# Patient Record
Sex: FEMALE | Race: WHITE | NOT HISPANIC OR LATINO | Employment: FULL TIME | URBAN - METROPOLITAN AREA
[De-identification: names, ages, dates, MRNs, and addresses within clinical notes are randomized per-mention and may not be internally consistent; named-entity substitution may affect disease eponyms.]

---

## 2017-09-04 ENCOUNTER — OFFICE VISIT (OUTPATIENT)
Dept: RETAIL CLINIC | Facility: CLINIC | Age: 37
End: 2017-09-04

## 2017-09-04 VITALS
DIASTOLIC BLOOD PRESSURE: 68 MMHG | SYSTOLIC BLOOD PRESSURE: 128 MMHG | HEIGHT: 68 IN | TEMPERATURE: 98.5 F | RESPIRATION RATE: 18 BRPM | BODY MASS INDEX: 33.56 KG/M2 | OXYGEN SATURATION: 98 % | WEIGHT: 221.4 LBS | HEART RATE: 68 BPM

## 2017-09-04 DIAGNOSIS — R05.9 COUGH: ICD-10-CM

## 2017-09-04 DIAGNOSIS — J02.9 SORE THROAT: Primary | ICD-10-CM

## 2017-09-04 LAB
EXPIRATION DATE: NORMAL
INTERNAL CONTROL: NORMAL
Lab: NORMAL
S PYO AG THROAT QL: NEGATIVE

## 2017-09-04 PROCEDURE — 87880 STREP A ASSAY W/OPTIC: CPT | Performed by: NURSE PRACTITIONER

## 2017-09-04 PROCEDURE — 99203 OFFICE O/P NEW LOW 30 MIN: CPT | Performed by: NURSE PRACTITIONER

## 2017-09-04 RX ORDER — METOPROLOL TARTRATE 50 MG/1
50 TABLET, FILM COATED ORAL 2 TIMES DAILY
COMMUNITY
End: 2018-04-30

## 2017-09-04 RX ORDER — DEXTROMETHORPHAN HYDROBROMIDE AND PROMETHAZINE HYDROCHLORIDE 15; 6.25 MG/5ML; MG/5ML
5 SYRUP ORAL 4 TIMES DAILY PRN
Qty: 180 ML | Refills: 0 | Status: SHIPPED | OUTPATIENT
Start: 2017-09-04 | End: 2018-04-30

## 2017-09-04 RX ORDER — GUAIFENESIN AND DEXTROMETHORPHAN HYDROBROMIDE 600; 30 MG/1; MG/1
1 TABLET, EXTENDED RELEASE ORAL 2 TIMES DAILY
Qty: 20 EACH | Refills: 0 | Status: SHIPPED | OUTPATIENT
Start: 2017-09-04 | End: 2018-04-30

## 2017-09-04 NOTE — PROGRESS NOTES
Subjective   Sunshine Joe is a 37 y.o. female.     History of Present Illness   Pt. Presents for cough and sore throat present for 2 days. She is not using any medications and has no fever or chills. Her son is sick at home with a viral sore throat.  The following portions of the patient's history were reviewed and updated as appropriate: allergies, current medications, past family history, past social history, past surgical history and problem list.    Review of Systems   Constitutional: Negative for activity change, appetite change, chills, fatigue and fever.   HENT: Positive for congestion and ear pain (right). Negative for ear discharge, sinus pressure, trouble swallowing and voice change.    Eyes: Negative.    Respiratory: Positive for cough. Negative for shortness of breath and wheezing.    Cardiovascular: Negative.    Musculoskeletal: Negative.    Skin: Negative.    Allergic/Immunologic: Negative.    Neurological: Negative.        Objective   Physical Exam   Constitutional: She is oriented to person, place, and time. She appears well-developed and well-nourished.   HENT:   Head: Normocephalic and atraumatic.   Right Ear: Tympanic membrane and external ear normal.   Left Ear: Tympanic membrane and external ear normal.   Nose: Mucosal edema and rhinorrhea present. Right sinus exhibits no maxillary sinus tenderness and no frontal sinus tenderness. Left sinus exhibits no maxillary sinus tenderness and no frontal sinus tenderness.   Mouth/Throat: Mucous membranes are normal. Posterior oropharyngeal erythema present. No oropharyngeal exudate, posterior oropharyngeal edema or tonsillar abscesses. Tonsils are 0 on the right. Tonsils are 0 on the left. No tonsillar exudate.   Eyes: Conjunctivae and EOM are normal. Pupils are equal, round, and reactive to light.   Neck: Normal range of motion. Neck supple.   Cardiovascular: Normal rate, regular rhythm and normal heart sounds.    Pulmonary/Chest: Effort normal  and breath sounds normal. No respiratory distress. She has no wheezes. She has no rales.   Lymphadenopathy:     She has no cervical adenopathy.   Neurological: She is alert and oriented to person, place, and time.   Skin: Skin is warm and dry.   Psychiatric: She has a normal mood and affect. Her behavior is normal. Judgment and thought content normal.   Nursing note and vitals reviewed.      Assessment/Plan   Sunshine was seen today for sore throat, cough and earache.    Diagnoses and all orders for this visit:    Sore throat  -     POC Rapid Strep A    Cough  -     guaifenesin-dextromethorphan (MUCINEX DM)  MG tablet sustained-release 12 hour tablet; Take 1 tablet by mouth 2 (Two) Times a Day.    Other orders  -     promethazine-dextromethorphan (PROMETHAZINE-DM) 6.25-15 MG/5ML syrup; Take 5 mL by mouth 4 (Four) Times a Day As Needed for Cough.      GINI Velázquez

## 2018-04-30 ENCOUNTER — OFFICE VISIT (OUTPATIENT)
Dept: OBSTETRICS AND GYNECOLOGY | Facility: CLINIC | Age: 38
End: 2018-04-30

## 2018-04-30 VITALS
BODY MASS INDEX: 35.82 KG/M2 | SYSTOLIC BLOOD PRESSURE: 120 MMHG | DIASTOLIC BLOOD PRESSURE: 80 MMHG | HEIGHT: 65 IN | WEIGHT: 215 LBS

## 2018-04-30 DIAGNOSIS — N94.6 DYSMENORRHEA: ICD-10-CM

## 2018-04-30 DIAGNOSIS — N92.6 IRREGULAR MENSES: Primary | ICD-10-CM

## 2018-04-30 DIAGNOSIS — N92.1 MENORRHAGIA WITH IRREGULAR CYCLE: ICD-10-CM

## 2018-04-30 DIAGNOSIS — F17.200 SMOKER: ICD-10-CM

## 2018-04-30 PROBLEM — Z90.49 S/P CHOLECYSTECTOMY: Status: ACTIVE | Noted: 2018-04-30

## 2018-04-30 PROBLEM — Z98.891 H/O CESAREAN SECTION: Status: ACTIVE | Noted: 2018-04-30

## 2018-04-30 PROCEDURE — 99406 BEHAV CHNG SMOKING 3-10 MIN: CPT | Performed by: OBSTETRICS & GYNECOLOGY

## 2018-04-30 PROCEDURE — 99395 PREV VISIT EST AGE 18-39: CPT | Performed by: OBSTETRICS & GYNECOLOGY

## 2018-04-30 RX ORDER — VARENICLINE TARTRATE 0.5 MG/1
0.5 TABLET, FILM COATED ORAL 2 TIMES DAILY
Qty: 60 TABLET | Refills: 3 | Status: SHIPPED | OUTPATIENT
Start: 2018-04-30 | End: 2018-07-30

## 2018-04-30 RX ORDER — CITALOPRAM 40 MG/1
TABLET ORAL
COMMUNITY
Start: 2018-03-20

## 2018-04-30 RX ORDER — METOPROLOL SUCCINATE 50 MG/1
TABLET, EXTENDED RELEASE ORAL
COMMUNITY
Start: 2018-03-20

## 2018-04-30 RX ORDER — IBUPROFEN 600 MG/1
TABLET ORAL
Refills: 0 | COMMUNITY
Start: 2018-02-18 | End: 2018-09-26

## 2018-04-30 NOTE — PROGRESS NOTES
"Subjective   Chief Complaint   Patient presents with   • Menstrual Problem     constant pain and bleeding almost everyday.      Sunshine Joe is a 38 y.o. year old No obstetric history on file. presenting to be seen for her annual exam.   She's been miserable with heavy periodsFor 5-6 days wearing 3 pads at a time.  She had 3 periods this past month.  She had to leave work because of some heavy is been going on since February.  She has \"ovary pain \"no further children desired.  She also has one stepson.  She is cramping as well.  She has 1 son change 2 years old and a stepson.  Current birth control method: none.    She is a smoker for 25 years.  Discussed health risks increased cancer risk bladder long effect on children in the home.  Increased risk of heart attack and stroke along with lung cancer.  Wrinkling of facial skin  Also cost a $5 per PACU about $2000 a year literally up and smoke.  Discussed various options she would like to go Chantix.  Discussed Nicorette gum and lozenges and patch.  Her mother was present.  Oral discussion relating to smoking cessation counseling lasted approximately 5 minutes.    Patient's last menstrual period was 04/21/2018.    She is not sexually active.   Condoms are not typically used.      Past 6 month menstrual history:    Cycle Frequency: irregular   Menstrual cycle character: flow is typically moderately heavy   Cycle Duration: 5 - 8   Number of heavy days of flows: 6   Intermenstrual bleeding present: {yes   Post-coital bleeding present: not asked     She exercises regularly: no.  Calcium intake: no.  She performs self breast exam:yes.  She has concerns about domestic violence: no.    The following portions of the patient's history were reviewed and updated as appropriate:problem list, current medications, allergies, past family history, past medical history, past social history and past surgical history.    Review of Systems    normal bladder and bowels  Objective " "  /80   Ht 164.5 cm (64.75\")   Wt 97.5 kg (215 lb)   LMP 04/21/2018 Comment: irregular periods; unknown cause  BMI 36.05 kg/m²      General:  well developed; well nourished  no acute distress  appears stated age   Skin:  No suspicious lesions seen   Thyroid:    Breasts:  Examined in supine position  Symmetric without masses or skin dimpling  Nipples normal without inversion, lesions or discharge  There are no palpable axillary nodes   Abdomen: no umbilical or inginual hernias are present  no hepato-splenomegaly  Slightly tender left lower quadrant greater than right   Pelvis: Clinical staff was present for exam  External genitalia:  normal appearance of the external genitalia including Bartholin's and [place]'s glands.  :  urethral meatus normal;  Vaginal:  normal pink mucosa without prolapse or lesions.  Cervix:  normal appearance. Pap smear obtained  Uterus:  normal size, shape and consistency. anteverted; Difficult to determine due to guarding  Adnexa:  Guarding bilaterally  Rectal:  digital rectal exam not performed; anus visually normal appearing.     Lab Review   No data reviewed    Imaging  No data reviewed       Assessment   1. Pelvic pain with dysmenorrhea and menorrhagia may be dyspareunia she can't remember irregular menses.  No children desired.  Discussed option such as IUD.  I think first will need to get an ultrasound.  2. One pack per day smoker 25-pack-year history.  She was able to quit while she was pregnant.  Would suggest Chantix as an option she would prefer this or gum or patches  3. She requested pain medications and I declined until after the ultrasound and no what's going on told her that I would rather not get her on narcotics if we can avoid   potential narcotic dependence.    Plan   1. 1000 mg calcium in divided doses ideally in diet; regular exercise  2. Ultrasound as discussed  3. Chantix with refills she just support meeting such as [facility].  4. Discussed " options possible IUD versus laparoscopy and ablation versus hysterectomy which is what she prefers.  A lot of this may be determined whether or not her insurance will cover hysterectomy.      Medications Rx this encounter:  New Medications Ordered This Visit   Medications   • citalopram (CeleXA) 40 MG tablet   • ibuprofen (ADVIL,MOTRIN) 600 MG tablet     Sig: TAKE 1 TABLET BY MOUTH 4 TIMES A DAY AS NEEDED FOR PAIN FOR 10 DAYS     Refill:  0   • metoprolol succinate XL (TOPROL-XL) 50 MG 24 hr tablet   • varenicline (CHANTIX) 0.5 MG tablet     Sig: Take 1 tablet by mouth 2 (Two) Times a Day.     Dispense:  60 tablet     Refill:  3          This note was electronically signed.    Jamari Cowart MD  4/30/2018

## 2018-05-01 ENCOUNTER — DOCUMENTATION (OUTPATIENT)
Dept: OBSTETRICS AND GYNECOLOGY | Facility: CLINIC | Age: 38
End: 2018-05-01

## 2018-05-01 ENCOUNTER — PREP FOR SURGERY (OUTPATIENT)
Dept: OTHER | Facility: HOSPITAL | Age: 38
End: 2018-05-01

## 2018-05-01 ENCOUNTER — TELEPHONE (OUTPATIENT)
Dept: OBSTETRICS AND GYNECOLOGY | Facility: CLINIC | Age: 38
End: 2018-05-01

## 2018-05-01 DIAGNOSIS — N92.1 MENORRHAGIA WITH IRREGULAR CYCLE: Primary | ICD-10-CM

## 2018-05-01 RX ORDER — SODIUM CHLORIDE 0.9 % (FLUSH) 0.9 %
1-10 SYRINGE (ML) INJECTION AS NEEDED
Status: CANCELLED | OUTPATIENT
Start: 2018-05-01

## 2018-05-01 NOTE — TELEPHONE ENCOUNTER
Provider Name  Dr Cowart    Reason for Call  Wants results from ultra sound done on 4/30/18    Pharmacy Name  [facility]  In [place], [place]    Call Back Number  [phone]

## 2018-05-11 ENCOUNTER — OUTSIDE FACILITY SERVICE (OUTPATIENT)
Dept: OBSTETRICS AND GYNECOLOGY | Facility: CLINIC | Age: 38
End: 2018-05-11

## 2018-05-11 PROCEDURE — 58563 HYSTEROSCOPY ABLATION: CPT | Performed by: OBSTETRICS & GYNECOLOGY

## 2018-05-11 RX ORDER — NYSTATIN 100000 [USP'U]/G
POWDER TOPICAL 3 TIMES DAILY
Qty: 45 G | Refills: 2 | Status: SHIPPED | OUTPATIENT
Start: 2018-05-11 | End: 2018-09-26

## 2018-07-30 ENCOUNTER — OFFICE VISIT (OUTPATIENT)
Dept: RETAIL CLINIC | Facility: CLINIC | Age: 38
End: 2018-07-30

## 2018-07-30 VITALS
HEIGHT: 64 IN | WEIGHT: 209 LBS | DIASTOLIC BLOOD PRESSURE: 78 MMHG | HEART RATE: 86 BPM | OXYGEN SATURATION: 99 % | SYSTOLIC BLOOD PRESSURE: 136 MMHG | RESPIRATION RATE: 18 BRPM | TEMPERATURE: 100 F | BODY MASS INDEX: 35.68 KG/M2

## 2018-07-30 DIAGNOSIS — R50.9 FEVER AND CHILLS: ICD-10-CM

## 2018-07-30 DIAGNOSIS — H66.004 RECURRENT ACUTE SUPPURATIVE OTITIS MEDIA OF RIGHT EAR WITHOUT SPONTANEOUS RUPTURE OF TYMPANIC MEMBRANE: Primary | ICD-10-CM

## 2018-07-30 PROCEDURE — 99213 OFFICE O/P EST LOW 20 MIN: CPT | Performed by: NURSE PRACTITIONER

## 2018-07-30 RX ORDER — PREDNISONE 5 MG/1
1 TABLET ORAL DAILY
Qty: 21 EACH | Refills: 0 | Status: SHIPPED | OUTPATIENT
Start: 2018-07-30 | End: 2018-09-26

## 2018-07-30 RX ORDER — AMOXICILLIN 500 MG/1
500 CAPSULE ORAL 2 TIMES DAILY
Qty: 20 CAPSULE | Refills: 0 | Status: SHIPPED | OUTPATIENT
Start: 2018-07-30 | End: 2018-08-09

## 2018-07-30 RX ORDER — ACETAMINOPHEN 500 MG
1000 TABLET ORAL ONCE
Status: COMPLETED | OUTPATIENT
Start: 2018-07-30 | End: 2018-07-30

## 2018-07-30 RX ADMIN — Medication 1000 MG: at 11:10

## 2018-07-30 NOTE — PROGRESS NOTES
"MAX@  Sunshine Joe is a 38 y.o. female.   Chief Complaint   Patient presents with   • Dizziness      Seen in ED \"about a week ago\" for earache.  No evidence of ear infection, told she had an air pocket around right eardrum, no treatment advised.      Dizziness   This is a new problem. The current episode started 1 to 4 weeks ago. The problem occurs constantly. The problem has been gradually worsening. Associated symptoms include chills, headaches and vertigo. Pertinent negatives include no abdominal pain, anorexia, arthralgias, change in bowel habit, chest pain, congestion, coughing, diaphoresis, fatigue, fever, joint swelling, myalgias, nausea, neck pain, numbness, rash, sore throat, swollen glands, urinary symptoms, visual change, vomiting or weakness. The symptoms are aggravated by coughing (standing up/sitting down quickly). She has tried acetaminophen and NSAIDs (cotton sock filled with cornmeal, heated in microwave used as compress) for the symptoms. The treatment provided no relief.        The following portions of the patient's history were reviewed and updated as appropriate: allergies, current medications, past family history, past medical history, past social history, past surgical history and problem list.    Current Outpatient Prescriptions:   •  amoxicillin (AMOXIL) 500 MG capsule, Take 1 capsule by mouth 2 (Two) Times a Day for 10 days., Disp: 20 capsule, Rfl: 0  •  ciprofloxacin-hydrocortisone (CIPRO HC) 0.2-1 % otic suspension, Administer 3 drops to the right ear 2 (Two) Times a Day for 5 days., Disp: 10 mL, Rfl: 0  •  citalopram (CeleXA) 40 MG tablet, , Disp: , Rfl:   •  ibuprofen (ADVIL,MOTRIN) 600 MG tablet, TAKE 1 TABLET BY MOUTH 4 TIMES A DAY AS NEEDED FOR PAIN FOR 10 DAYS, Disp: , Rfl: 0  •  metoprolol succinate XL (TOPROL-XL) 50 MG 24 hr tablet, , Disp: , Rfl:   •  nystatin (MYCOSTATIN) 165896 UNIT/GM powder, Apply  topically 3 (Three) Times a Day., Disp: 45 g, Rfl: 2  •  " "PredniSONE 5 MG (21) tablet therapy pack dosepak, Take 1 tablet by mouth Daily. Take as directed on package instructions., Disp: 21 each, Rfl: 0  No current facility-administered medications for this visit.     Allergies   Allergen Reactions   • Morphine Palpitations   • Tramadol Palpitations and Other (See Comments)     \"Feels like I'm dying\"       Review of Systems   Constitutional: Positive for chills. Negative for diaphoresis, fatigue and fever.   HENT: Positive for ear discharge (yellow green, right ear), ear pain (right) and hearing loss (right). Negative for congestion, facial swelling, postnasal drip, rhinorrhea, sinus pressure, sneezing, sore throat and tinnitus.    Eyes: Negative for redness and itching.   Respiratory: Negative for cough, shortness of breath and wheezing.    Cardiovascular: Negative for chest pain and palpitations.   Gastrointestinal: Negative for abdominal pain, anorexia, change in bowel habit, diarrhea, nausea and vomiting.   Musculoskeletal: Negative for arthralgias, joint swelling, myalgias and neck pain.   Skin: Negative for rash.   Neurological: Positive for dizziness, vertigo and headaches. Negative for weakness and numbness.       Objective     Visit Vitals  /78   Pulse 86   Temp 100 °F (37.8 °C) (Oral)   Resp 18   Ht 162.6 cm (64\")   Wt 94.8 kg (209 lb)   LMP 05/11/2018   SpO2 99%   BMI 35.87 kg/m²         Physical Exam   Constitutional: She is oriented to person, place, and time. She appears well-developed and well-nourished. She appears distressed.   HENT:   Head: Normocephalic.   Right Ear: External ear normal. There is tenderness. No drainage. No mastoid tenderness. Tympanic membrane is injected, erythematous and bulging.   Left Ear: Tympanic membrane, external ear and ear canal normal.   Nose: Nose normal.   Mouth/Throat: Uvula is midline and mucous membranes are normal. Posterior oropharyngeal erythema present.   Right ear canal erythematous   Eyes: Conjunctivae are " normal.   Cardiovascular: Normal rate, regular rhythm and normal heart sounds.    Pulmonary/Chest: Effort normal and breath sounds normal.   Lymphadenopathy:     She has cervical adenopathy (bilateral anterior).   Neurological: She is alert and oriented to person, place, and time.       Lab Results (last 24 hours)     ** No results found for the last 24 hours. **          Assessment/Plan   Sunshine was seen today for dizziness.    Diagnoses and all orders for this visit:    Recurrent acute suppurative otitis media of right ear without spontaneous rupture of tympanic membrane  -     amoxicillin (AMOXIL) 500 MG capsule; Take 1 capsule by mouth 2 (Two) Times a Day for 10 days.  -     ciprofloxacin-hydrocortisone (CIPRO HC) 0.2-1 % otic suspension; Administer 3 drops to the right ear 2 (Two) Times a Day for 5 days. Changed to ofloxacin, 10 drops in right ear x 5 days, due to insurance coverage of prescriptions.    -     PredniSONE 5 MG (21) tablet therapy pack dosepak; Take 1 tablet by mouth Daily. Take as directed on package instructions. Monitor BP while taking this medication; if > 130/70, call PCP/clinic for further instructions.  - May continue using cornmeal in cotton sock warm compress, wash sock after each use  - Drink plenty of clear, decaffeinated fluids, as tolerated    Fever and chills  -     acetaminophen (TYLENOL) tablet 1,000 mg; Take 2 tablets by mouth 1 (One) Time.  - Acetaminophen or ibuprofen, per package directions, as needed for fever >100 F, headache, earache, mild pain

## 2018-07-30 NOTE — PATIENT INSTRUCTIONS
Drink plenty of clear, decaffeinated fluids, as tolerated.   Acetaminophen or ibuprofen, per package directions, as needed for headache, earache, fever > 100  Continue to use cornmeal sock as warm compress  Keep ear clean and dry    Prednisone tablets  What is this medicine?  PREDNISONE (PRED ni sone) is a corticosteroid. It is commonly used to treat inflammation of the skin, joints, lungs, and other organs. Common conditions treated include asthma, allergies, and arthritis. It is also used for other conditions, such as blood disorders and diseases of the adrenal glands.  This medicine may be used for other purposes; ask your health care provider or pharmacist if you have questions.  COMMON BRAND NAME(S): Deltasone, Predone, Sterapred, Sterapred DS  What should I tell my health care provider before I take this medicine?  They need to know if you have any of these conditions:  -Cushing's syndrome  -diabetes  -glaucoma  -heart disease  -high blood pressure  -infection (especially a virus infection such as chickenpox, cold sores, or herpes)  -kidney disease  -liver disease  -mental illness  -myasthenia gravis  -osteoporosis  -seizures  -stomach or intestine problems  -thyroid disease  -an unusual or allergic reaction to lactose, prednisone, other medicines, foods, dyes, or preservatives  -pregnant or trying to get pregnant  -breast-feeding  How should I use this medicine?  Take this medicine by mouth with a glass of water. Follow the directions on the prescription label. Take this medicine with food. If you are taking this medicine once a day, take it in the morning. Do not take more medicine than you are told to take. Do not suddenly stop taking your medicine because you may develop a severe reaction. Your doctor will tell you how much medicine to take. If your doctor wants you to stop the medicine, the dose may be slowly lowered over time to avoid any side effects.  Talk to your pediatrician regarding the use of this  medicine in children. Special care may be needed.  Overdosage: If you think you have taken too much of this medicine contact a poison control center or emergency room at once.  NOTE: This medicine is only for you. Do not share this medicine with others.  What if I miss a dose?  If you miss a dose, take it as soon as you can. If it is almost time for your next dose, talk to your doctor or health care professional. You may need to miss a dose or take an extra dose. Do not take double or extra doses without advice.  What may interact with this medicine?  Do not take this medicine with any of the following medications:  -metyrapone  -mifepristone  This medicine may also interact with the following medications:  -aminoglutethimide  -amphotericin B  -aspirin and aspirin-like medicines  -barbiturates  -certain medicines for diabetes, like glipizide or glyburide  -cholestyramine  -cholinesterase inhibitors  -cyclosporine  -digoxin  -diuretics  -ephedrine  -female hormones, like estrogens and birth control pills  -isoniazid  -ketoconazole  -NSAIDS, medicines for pain and inflammation, like ibuprofen or naproxen  -phenytoin  -rifampin  -toxoids  -vaccines  -warfarin  This list may not describe all possible interactions. Give your health care provider a list of all the medicines, herbs, non-prescription drugs, or dietary supplements you use. Also tell them if you smoke, drink alcohol, or use illegal drugs. Some items may interact with your medicine.  What should I watch for while using this medicine?  Visit your doctor or health care professional for regular checks on your progress. If you are taking this medicine over a prolonged period, carry an identification card with your name and address, the type and dose of your medicine, and your doctor's name and address.  This medicine may increase your risk of getting an infection. Tell your doctor or health care professional if you are around anyone with measles or chickenpox, or  if you develop sores or blisters that do not heal properly.  If you are going to have surgery, tell your doctor or health care professional that you have taken this medicine within the last twelve months.  Ask your doctor or health care professional about your diet. You may need to lower the amount of salt you eat.  This medicine may affect blood sugar levels. If you have diabetes, check with your doctor or health care professional before you change your diet or the dose of your diabetic medicine.  What side effects may I notice from receiving this medicine?  Side effects that you should report to your doctor or health care professional as soon as possible:  -allergic reactions like skin rash, itching or hives, swelling of the face, lips, or tongue  -changes in emotions or moods  -changes in vision  -depressed mood  -eye pain  -fever or chills, cough, sore throat, pain or difficulty passing urine  -increased thirst  -swelling of ankles, feet  Side effects that usually do not require medical attention (report to your doctor or health care professional if they continue or are bothersome):  -confusion, excitement, restlessness  -headache  -nausea, vomiting  -skin problems, acne, thin and shiny skin  -trouble sleeping  -weight gain  This list may not describe all possible side effects. Call your doctor for medical advice about side effects. You may report side effects to FDA at 2-223-FDA-7525.  Where should I keep my medicine?  Keep out of the reach of children.  Store at room temperature between 15 and 30 degrees C (59 and 86 degrees F). Protect from light. Keep container tightly closed. Throw away any unused medicine after the expiration date.  NOTE: This sheet is a summary. It may not cover all possible information. If you have questions about this medicine, talk to your doctor, pharmacist, or health care provider.  © 2018 Elsevier/Gold Standard (2012-08-02 10:57:14)      Otitis Media, Adult  Otitis media is redness,  soreness, and puffiness (swelling) in the space just behind your eardrum (middle ear). It may be caused by allergies or infection. It often happens along with a cold.  Follow these instructions at home:  · Take your medicine as told. Finish it even if you start to feel better.  · Only take over-the-counter or prescription medicines for pain, discomfort, or fever as told by your doctor.  · Follow up with your doctor as told.  Contact a doctor if:  · You have otitis media only in one ear, or bleeding from your nose, or both.  · You notice a lump on your neck.  · You are not getting better in 3-5 days.  · You feel worse instead of better.  Get help right away if:  · You have pain that is not helped with medicine.  · You have puffiness, redness, or pain around your ear.  · You get a stiff neck.  · You cannot move part of your face (paralysis).  · You notice that the bone behind your ear hurts when you touch it.  This information is not intended to replace advice given to you by your health care provider. Make sure you discuss any questions you have with your health care provider.  Document Released: 06/05/2009 Document Revised: 05/25/2017 Document Reviewed: 07/15/2014  Elsevier Interactive Patient Education © 2017 [employer] Inc[employer]

## 2018-09-21 ENCOUNTER — TRANSCRIBE ORDERS (OUTPATIENT)
Dept: ADMINISTRATIVE | Facility: HOSPITAL | Age: 38
End: 2018-09-21

## 2018-09-21 DIAGNOSIS — R10.32 ABDOMINAL PAIN, LLQ (LEFT LOWER QUADRANT): Primary | ICD-10-CM

## 2018-09-26 ENCOUNTER — OFFICE VISIT (OUTPATIENT)
Dept: OBSTETRICS AND GYNECOLOGY | Facility: CLINIC | Age: 38
End: 2018-09-26

## 2018-09-26 VITALS
BODY MASS INDEX: 35.36 KG/M2 | DIASTOLIC BLOOD PRESSURE: 70 MMHG | SYSTOLIC BLOOD PRESSURE: 118 MMHG | RESPIRATION RATE: 16 BRPM | WEIGHT: 206 LBS

## 2018-09-26 DIAGNOSIS — N94.6 DYSMENORRHEA: ICD-10-CM

## 2018-09-26 DIAGNOSIS — Z98.891 H/O CESAREAN SECTION: ICD-10-CM

## 2018-09-26 DIAGNOSIS — N92.1 MENORRHAGIA WITH IRREGULAR CYCLE: Primary | ICD-10-CM

## 2018-09-26 DIAGNOSIS — R10.32 LEFT LOWER QUADRANT PAIN: ICD-10-CM

## 2018-09-26 PROCEDURE — 99213 OFFICE O/P EST LOW 20 MIN: CPT | Performed by: OBSTETRICS & GYNECOLOGY

## 2018-09-26 RX ORDER — ERGOCALCIFEROL 1.25 MG/1
CAPSULE ORAL
COMMUNITY
Start: 2018-09-21

## 2018-09-26 RX ORDER — FLUCONAZOLE 150 MG/1
150 TABLET ORAL DAILY
Qty: 1 TABLET | Refills: 0 | Status: SHIPPED | OUTPATIENT
Start: 2018-09-26

## 2018-09-26 NOTE — PROGRESS NOTES
Subjective   Chief Complaint   Patient presents with   • Abdominal Pain     LLQ pain     Sunshine Joe is a 38 y.o. year old No obstetric history on file. presenting to be seen because of pelvic pain for about a week.  Left side greater than right.  This will come and go.  Has had some normal bowel movements.  Bladder has been tender last few days.  No real fever or change in vaginal discharge.  Pain is described as sharp nothing really aggravates it or relieves it.  This may resolve slightly on its own.   Current birth control method: condoms. Pain with sex also left > right     Past 6 month menstrual and contraceptive history:    No LMP recorded. Patient has had an ablation. May 11th 2018  Cycle Frequency: absent crampy sort of pain.  Also irritability consistent with PMS without cycles because of the ablation.                             The following portions of the patient's history were reviewed and updated as appropriate:problem list, current medications and allergies    Review of Systems please see above.     Objective   /70   Resp 16   Wt 93.4 kg (206 lb)   BMI 35.36 kg/m²     General:  well developed; well nourished  no acute distress  appears stated age   Skin:  No suspicious lesions seen   Thyroid: not examined   Lungs:  breathing is unlabored   Heart:  Not performed.       Abdomen: no umbilical or inginual hernias are present  no hepato-splenomegaly  tender in general no CVAT   She has redness and rash under panniculus consistent with yeast.     Pelvis: Clinical staff was present during examination.  External genitalia are normal.  On bimanual examination the bladder is tender to single digit examination.  Minimal cervical motion tenderness.  Much less then left lower quadrant were no masses palpable there is some guarding.       Lab Review   No data reviewed    Imaging   No data reviewed       Assessment   1. Pelvic pain left lower quadrant greater than right  2. .  Amenorrhea secondary  to ablation  3. Dysmenorrhea persists along with PMS mild  4. Probable yeast infection under panniculus     Plan   1.   Check ultrasound and urinalysis  Diflucan and addition of over-the-counter yeast medications for abdominal yeast infection    Medications Rx this encounter:  New Medications Ordered This Visit   Medications   • fluconazole (DIFLUCAN) 150 MG tablet     Sig: Take 1 tablet by mouth Daily.     Dispense:  1 tablet     Refill:  0          This note was electronically signed.    Jamari Cowart MD  September 26, 2018